# Patient Record
Sex: MALE | Race: WHITE | NOT HISPANIC OR LATINO | Employment: FULL TIME | URBAN - NONMETROPOLITAN AREA
[De-identification: names, ages, dates, MRNs, and addresses within clinical notes are randomized per-mention and may not be internally consistent; named-entity substitution may affect disease eponyms.]

---

## 2017-03-21 ENCOUNTER — OFFICE VISIT (OUTPATIENT)
Dept: RETAIL CLINIC | Facility: CLINIC | Age: 52
End: 2017-03-21

## 2017-03-21 VITALS
DIASTOLIC BLOOD PRESSURE: 74 MMHG | TEMPERATURE: 98.9 F | BODY MASS INDEX: 26.86 KG/M2 | HEART RATE: 76 BPM | WEIGHT: 177.2 LBS | RESPIRATION RATE: 18 BRPM | OXYGEN SATURATION: 97 % | SYSTOLIC BLOOD PRESSURE: 122 MMHG | HEIGHT: 68 IN

## 2017-03-21 DIAGNOSIS — K29.71 GASTROINTESTINAL HEMORRHAGE ASSOCIATED WITH GASTRITIS, UNSPECIFIED GASTRITIS TYPE: Primary | ICD-10-CM

## 2017-03-21 PROCEDURE — 99213 OFFICE O/P EST LOW 20 MIN: CPT | Performed by: NURSE PRACTITIONER

## 2017-03-21 NOTE — PATIENT INSTRUCTIONS
Gastrointestinal Bleeding  Gastrointestinal bleeding is bleeding somewhere along the path food travels through the body (digestive tract). This path is anywhere between the mouth and the opening of the butt (anus). You may have blood in your poop (stools) or have black poop. If you throw up (vomit), there may be blood in it.  This condition can be mild, serious, or even life-threatening. If you have a lot of bleeding, you may need to stay in the hospital.  HOME CARE  · Take over-the-counter and prescription medicines only as told by your doctor.    · Eat foods that have a lot of fiber in them. These foods include whole grains, fruits, and vegetables. You can also try eating 1-3 prunes each day.  · Drink enough fluid to keep your pee (urine) clear or pale yellow.  · Keep all follow-up visits as told by your doctor. This is important.  GET HELP IF:  · Your symptoms do not get better.  GET HELP RIGHT AWAY IF:  · Your bleeding gets worse.  · You feel dizzy or you pass out (faint).  · You feel weak.  · You have very bad cramps in your back or belly (abdomen).  · You pass large clumps of blood (clots) in your poop.  · Your symptoms are getting worse.     This information is not intended to replace advice given to you by your health care provider. Make sure you discuss any questions you have with your health care provider.     Document Released: 09/26/2009 Document Revised: 01/13/2017 Document Reviewed: 06/06/2016  [employer] Interactive Patient Education ©2016 [employer]

## 2017-03-21 NOTE — PROGRESS NOTES
Subjective   Maksim Molina is a 51 y.o. male who presents to the clinic with complaints of feeling light headed and nauseous. He also states he has had blood in his stool. He states he has a cold last week and took ibuprofen and OTC cold medicine (nyquil). He states he has had a GI bleed in the past r/t Hpylori.     Dizziness   This is a new problem. The current episode started yesterday. The problem has been unchanged. Associated symptoms include nausea. Pertinent negatives include no abdominal pain, fever or vomiting. He has tried nothing for the symptoms.   Nausea   This is a new problem. The current episode started yesterday. Associated symptoms include nausea. Pertinent negatives include no abdominal pain, fever or vomiting. He has tried nothing for the symptoms.        No current outpatient prescriptions on file prior to visit.     No current facility-administered medications on file prior to visit.        No Known Allergies    Past Medical History   Diagnosis Date   • Allergic    • Stomach ulcer        Past Surgical History   Procedure Laterality Date   • Hernia repair  1968   • Trigger finger release  07/2014     thumb       Family History   Problem Relation Age of Onset   • Cancer Father    • Heart disease Father        Social History     Social History   • Marital status: Significant Other     Spouse name: N/A   • Number of children: N/A   • Years of education: N/A     Occupational History   • Not on file.     Social History Main Topics   • Smoking status: Former Smoker     Packs/day: 1.50     Years: 15.00     Types: Cigarettes     Quit date: 2001   • Smokeless tobacco: Not on file   • Alcohol use No   • Drug use: Not on file   • Sexual activity: Not on file     Other Topics Concern   • Not on file     Social History Narrative   • No narrative on file       Review of Systems   Constitutional: Negative for fever.   Gastrointestinal: Positive for blood in stool and nausea. Negative for abdominal pain,  "diarrhea and vomiting.   Neurological: Positive for dizziness and light-headedness.       Visit Vitals   • /74 (BP Location: Right arm)   • Pulse 76   • Temp 98.9 °F (37.2 °C) (Oral)   • Resp 18   • Ht 68\" (172.7 cm)   • Wt 177 lb 3.2 oz (80.4 kg)   • SpO2 97%   • BMI 26.94 kg/m2       Objective   Physical Exam   Constitutional: He is oriented to person, place, and time. Vital signs are normal. He appears well-developed and well-nourished. He does not appear ill. No distress.   HENT:   Head: Normocephalic and atraumatic.   Right Ear: Hearing normal.   Left Ear: Hearing normal.   Nose: Nose normal.   Eyes: Pupils are equal, round, and reactive to light.   Neck: Normal range of motion.   Cardiovascular: Normal rate, regular rhythm, S1 normal, S2 normal and normal heart sounds.    Pulmonary/Chest: Effort normal and breath sounds normal. No respiratory distress.   Abdominal: Soft. Bowel sounds are normal. He exhibits no distension. There is no tenderness. There is no rigidity, no rebound and no guarding.   Musculoskeletal: Normal range of motion.   Neurological: He is alert and oriented to person, place, and time.   Skin: Skin is warm and dry.   Psychiatric: He has a normal mood and affect. His behavior is normal. Judgment and thought content normal.   Nursing note and vitals reviewed.      No results found for this or any previous visit.  Assessment/Plan   Maksim was seen today for dizziness and nausea.    Diagnoses and all orders for this visit:    Gastrointestinal hemorrhage associated with gastritis, unspecified gastritis type              "